# Patient Record
Sex: FEMALE | Race: WHITE | NOT HISPANIC OR LATINO
[De-identification: names, ages, dates, MRNs, and addresses within clinical notes are randomized per-mention and may not be internally consistent; named-entity substitution may affect disease eponyms.]

---

## 2019-05-07 ENCOUNTER — RESULT REVIEW (OUTPATIENT)
Age: 84
End: 2019-05-07

## 2021-02-11 PROBLEM — Z00.00 ENCOUNTER FOR PREVENTIVE HEALTH EXAMINATION: Status: ACTIVE | Noted: 2021-02-11

## 2021-04-23 ENCOUNTER — RX RENEWAL (OUTPATIENT)
Age: 86
End: 2021-04-23

## 2021-05-20 ENCOUNTER — APPOINTMENT (OUTPATIENT)
Dept: PULMONOLOGY | Facility: CLINIC | Age: 86
End: 2021-05-20

## 2021-08-06 ENCOUNTER — APPOINTMENT (OUTPATIENT)
Dept: PULMONOLOGY | Facility: CLINIC | Age: 86
End: 2021-08-06
Payer: MEDICARE

## 2021-08-06 VITALS
OXYGEN SATURATION: 95 % | SYSTOLIC BLOOD PRESSURE: 105 MMHG | HEART RATE: 79 BPM | DIASTOLIC BLOOD PRESSURE: 55 MMHG | TEMPERATURE: 97.4 F

## 2021-08-06 PROCEDURE — 99214 OFFICE O/P EST MOD 30 MIN: CPT

## 2021-08-06 NOTE — HISTORY OF PRESENT ILLNESS
[Former] : former [Never] : never [TextBox_4] : 88 female  hx copd and for fu visit\par using anoro and spiriva\par overall feels good  sl cough and minimal dyspnea\par +swallow study sl food retention and adjusting diet\par no wheeze no  chest pain no tightness\par no nite awakening [TextBox_11] : 0.2 [TextBox_13] : 10 [YearQuit] : 1983

## 2022-02-25 ENCOUNTER — APPOINTMENT (OUTPATIENT)
Dept: PULMONOLOGY | Facility: CLINIC | Age: 87
End: 2022-02-25
Payer: MEDICARE

## 2022-02-25 VITALS
DIASTOLIC BLOOD PRESSURE: 87 MMHG | BODY MASS INDEX: 23.99 KG/M2 | TEMPERATURE: 97.2 F | WEIGHT: 119 LBS | HEIGHT: 59 IN | OXYGEN SATURATION: 95 % | HEART RATE: 119 BPM | SYSTOLIC BLOOD PRESSURE: 144 MMHG

## 2022-02-25 PROCEDURE — 99214 OFFICE O/P EST MOD 30 MIN: CPT

## 2022-02-25 RX ORDER — AMITRIPTYLINE HYDROCHLORIDE 75 MG/1
75 TABLET, FILM COATED ORAL
Refills: 0 | Status: ACTIVE | COMMUNITY

## 2022-02-25 RX ORDER — BIOTIN 10 MG
TABLET ORAL
Refills: 0 | Status: ACTIVE | COMMUNITY

## 2022-02-25 RX ORDER — SOLIFENACIN SUCCINATE 10 MG/1
10 TABLET ORAL
Refills: 0 | Status: ACTIVE | COMMUNITY

## 2022-02-25 RX ORDER — PANTOPRAZOLE SODIUM 40 MG/1
40 GRANULE, DELAYED RELEASE ORAL
Refills: 0 | Status: ACTIVE | COMMUNITY

## 2022-02-25 RX ORDER — CYPROHEPTADINE HYDROCHLORIDE 4 MG/1
4 TABLET ORAL
Refills: 0 | Status: ACTIVE | COMMUNITY

## 2022-02-25 RX ORDER — MULTIVIT-MIN/FOLIC/VIT K/LYCOP 400-300MCG
50 MCG TABLET ORAL
Refills: 0 | Status: ACTIVE | COMMUNITY

## 2022-02-25 RX ORDER — POTASSIUM CHLORIDE 10 MEQ
10 CAPSULE, EXTENDED RELEASE ORAL
Refills: 0 | Status: ACTIVE | COMMUNITY

## 2022-02-25 RX ORDER — ATENOLOL 25 MG/1
25 TABLET ORAL
Refills: 0 | Status: ACTIVE | COMMUNITY

## 2022-02-25 RX ORDER — ATORVASTATIN CALCIUM 20 MG/1
20 TABLET, FILM COATED ORAL
Refills: 0 | Status: ACTIVE | COMMUNITY

## 2022-02-25 RX ORDER — ELECTROLYTES/DEXTROSE
10 SOLUTION, ORAL ORAL
Refills: 0 | Status: ACTIVE | COMMUNITY

## 2022-02-25 RX ORDER — PSYLLIUM HUSK 0.4 G
CAPSULE ORAL
Refills: 0 | Status: ACTIVE | COMMUNITY

## 2022-02-25 RX ORDER — LEVOTHYROXINE SODIUM 25 UG/1
25 TABLET ORAL
Refills: 0 | Status: ACTIVE | COMMUNITY

## 2022-02-25 RX ORDER — MELOXICAM 7.5 MG/1
7.5 TABLET ORAL
Refills: 0 | Status: ACTIVE | COMMUNITY

## 2022-02-25 NOTE — REASON FOR VISIT
[Follow-Up] : a follow-up visit [COPD] : COPD [Family Member] : family member [TextBox_44] : 6 months. Pt states she has been feeling well, pt complains of SOB on exertion but it is controllable.

## 2022-02-25 NOTE — DISCUSSION/SUMMARY
[FreeTextEntry1] : Juno Meza has COPD.  She is well maintained on the Anoro 1 inhalation a day.  She denies any significant change in her symptoms and is able to perform all of her activities of daily living.  At this time we will continue her current medication and therapy.  Patient and her daughter understand agree with this plan of care.\par The patient understands and agrees with plan of care.\par Today's office visit encompassed 46  minutes. I conducted an extensive history ,physical exam and reviewed diagnosis and treatment options  including diagnostic tests,radiologic studies including cat scans  and the use of prescription medication.

## 2022-02-25 NOTE — HISTORY OF PRESENT ILLNESS
[Former] : former [Never] : never [TextBox_4] : 89 female hx copd on anoro 1 qd for fu visit\par feels good  occ dyspnea  with  activity  walk \par no chest pain no cough no phlegm\par no wheezing in chest \par no nite awakening\par using anoro 1 qd\par no new environmental change [TextBox_11] : 0.2 [TextBox_13] : 10 [YearQuit] : 1983

## 2022-06-16 ENCOUNTER — APPOINTMENT (OUTPATIENT)
Dept: ORTHOPEDIC SURGERY | Facility: CLINIC | Age: 87
End: 2022-06-16
Payer: MEDICARE

## 2022-06-16 DIAGNOSIS — I10 ESSENTIAL (PRIMARY) HYPERTENSION: ICD-10-CM

## 2022-06-16 DIAGNOSIS — R00.0 TACHYCARDIA, UNSPECIFIED: ICD-10-CM

## 2022-06-16 DIAGNOSIS — M54.16 RADICULOPATHY, LUMBAR REGION: ICD-10-CM

## 2022-06-16 DIAGNOSIS — M19.90 UNSPECIFIED OSTEOARTHRITIS, UNSPECIFIED SITE: ICD-10-CM

## 2022-06-16 PROCEDURE — 99213 OFFICE O/P EST LOW 20 MIN: CPT

## 2022-06-16 PROCEDURE — 73503 X-RAY EXAM HIP UNI 4/> VIEWS: CPT | Mod: RT

## 2022-06-16 NOTE — ASSESSMENT
[FreeTextEntry1] : 88 yo female presenting with lumbar radiculopathy with radiation down RLE. X-rays negative for fractures/abnormalities\par -Recommended that patient follow up with her spine surgeon, Dr. Fan\par -Activities as tolerated\par -Rest, ice, PRN for pain\par -All questions answered\par -F/u PRN\par

## 2022-06-16 NOTE — PHYSICAL EXAM
[Right] : right hip [NL (120)] : flexion 120 degrees [NL (30)] : extension 30 degrees [NL (35)] : adduction 35 degrees [NL (45)] : internal rotation 45 degrees [5___] : adduction 5[unfilled]/5 [1+] : posterior tibialis pulse: 1+ [] : uses walker [AP] : anteroposterior [Lateral] : lateral [There are no fractures, subluxations or dislocations. No significant abnormalities are seen] : There are no fractures, subluxations or dislocations. No significant abnormalities are seen

## 2022-06-16 NOTE — HISTORY OF PRESENT ILLNESS
[7] : 7 [Sharp] : sharp [Walking] : walking [de-identified] : Patient reports pain in right hip/ groin x~1 month with NKI \par Reports pain radiates from right side of groin down leg into the foot\par Denies numbness or tingling in LEs \par Pt is using a walker for ambulation \par Reports having hx of Left hip fx in 2013- states she had surgery (can not recall what kind) \par pt is taking Meloxicam for arthritis \par Denies imaging or treatments to the right hip  [] : no

## 2022-08-18 ENCOUNTER — APPOINTMENT (OUTPATIENT)
Dept: PULMONOLOGY | Facility: CLINIC | Age: 87
End: 2022-08-18

## 2022-08-18 VITALS
OXYGEN SATURATION: 83 % | WEIGHT: 119 LBS | DIASTOLIC BLOOD PRESSURE: 70 MMHG | BODY MASS INDEX: 23.99 KG/M2 | TEMPERATURE: 97 F | HEART RATE: 74 BPM | HEIGHT: 59 IN | SYSTOLIC BLOOD PRESSURE: 122 MMHG

## 2022-08-18 VITALS — OXYGEN SATURATION: 94 % | HEART RATE: 74 BPM

## 2022-08-18 PROCEDURE — 99213 OFFICE O/P EST LOW 20 MIN: CPT

## 2022-08-18 RX ORDER — UMECLIDINIUM BROMIDE AND VILANTEROL TRIFENATATE 62.5; 25 UG/1; UG/1
62.5-25 POWDER RESPIRATORY (INHALATION)
Qty: 3 | Refills: 3 | Status: DISCONTINUED | COMMUNITY
Start: 2021-04-23 | End: 2022-08-18

## 2022-08-18 RX ORDER — UMECLIDINIUM BROMIDE AND VILANTEROL TRIFENATATE 62.5; 25 UG/1; UG/1
62.5-25 POWDER RESPIRATORY (INHALATION)
Qty: 1 | Refills: 6 | Status: ACTIVE | COMMUNITY
Start: 2022-02-25 | End: 1900-01-01

## 2022-08-18 NOTE — DISCUSSION/SUMMARY
[FreeTextEntry1] : Ms. Meza is doing very well.  She is well controlled on the Anoro 1 inhalation daily.  She is able to perform full activity.  We will continue this current therapy.\par The patient understands and agrees with plan of care.\par Today's office visit encompassed 32 minutes. I conducted an extensive history ,physical exam and reviewed diagnosis and treatment options  including diagnostic tests,radiologic studies including  cat scans  and the use of prescription medication.

## 2022-08-18 NOTE — REASON FOR VISIT
[Follow-Up] : a follow-up visit [COPD] : COPD [Family Member] : family member [TextBox_44] : 6 months.  Patient states that Anoro inhaler is working great.  No other pulmonary complaints.

## 2022-08-18 NOTE — HISTORY OF PRESENT ILLNESS
[Former] : former [Never] : never [TextBox_4] : 89 female hx copd for fu visit\par today feels good  no shortness of breath no .cough  no.cp no tightness \par full activity no limitation\par remains on anoro 1 qd \par no nite awakening for dyspnea [TextBox_11] : 0.2 [TextBox_13] : 10 [YearQuit] : 1983

## 2022-08-29 ENCOUNTER — RX RENEWAL (OUTPATIENT)
Age: 87
End: 2022-08-29

## 2022-08-29 RX ORDER — UMECLIDINIUM BROMIDE AND VILANTEROL TRIFENATATE 62.5; 25 UG/1; UG/1
62.5-25 POWDER RESPIRATORY (INHALATION)
Qty: 180 | Refills: 3 | Status: ACTIVE | COMMUNITY
Start: 2021-08-06 | End: 1900-01-01

## 2023-02-16 ENCOUNTER — APPOINTMENT (OUTPATIENT)
Dept: PULMONOLOGY | Facility: CLINIC | Age: 88
End: 2023-02-16
Payer: MEDICARE

## 2023-02-16 VITALS
BODY MASS INDEX: 23.99 KG/M2 | HEIGHT: 59 IN | OXYGEN SATURATION: 96 % | HEART RATE: 79 BPM | WEIGHT: 119 LBS | DIASTOLIC BLOOD PRESSURE: 62 MMHG | TEMPERATURE: 96.4 F | SYSTOLIC BLOOD PRESSURE: 112 MMHG

## 2023-02-16 DIAGNOSIS — J44.9 CHRONIC OBSTRUCTIVE PULMONARY DISEASE, UNSPECIFIED: ICD-10-CM

## 2023-02-16 PROCEDURE — 99214 OFFICE O/P EST MOD 30 MIN: CPT

## 2023-02-16 RX ORDER — MULTIVIT-MIN/IRON/FOLIC ACID/K 18-600-40
500 CAPSULE ORAL
Refills: 0 | Status: ACTIVE | COMMUNITY
Start: 2023-02-16

## 2023-02-16 RX ORDER — BLOOD SUGAR DIAGNOSTIC
300 STRIP MISCELLANEOUS
Refills: 0 | Status: ACTIVE | COMMUNITY
Start: 2023-02-16

## 2023-02-16 RX ORDER — LEVOTHYROXINE SODIUM 0.03 MG/1
25 TABLET ORAL
Refills: 0 | Status: ACTIVE | COMMUNITY

## 2023-02-16 RX ORDER — LORATADINE 5 MG/5 ML
5 SOLUTION, ORAL ORAL
Refills: 0 | Status: ACTIVE | COMMUNITY

## 2023-02-16 RX ORDER — ELECTROLYTES/DEXTROSE
10 SOLUTION, ORAL ORAL
Refills: 0 | Status: ACTIVE | COMMUNITY
Start: 2023-02-16

## 2023-02-16 RX ORDER — ADHESIVE TAPE 3"X 2.3 YD
50 MCG TAPE, NON-MEDICATED TOPICAL
Refills: 0 | Status: ACTIVE | COMMUNITY
Start: 2023-02-16

## 2023-02-16 NOTE — DISCUSSION/SUMMARY
[FreeTextEntry1] : Ms. Meza has a history of COPD.  She has been on Anoro 1 inhalation daily and is doing very well.  She denies all symptoms.  We will continue current therapy.  We will perform pulmonary function test on her next visit.\par The patient understands and agrees with plan of care.\par Today's office visit encompassed 32 minutes. I conducted an extensive history ,physical exam and reviewed diagnosis and treatment options  including diagnostic tests,radiologic studies including  cat scans  and the use of prescription medication.

## 2023-02-16 NOTE — HISTORY OF PRESENT ILLNESS
[Former] : former [Never] : never [TextBox_4] : 89 female hx copd\par today feels good  no shortness of breath no cough no wheeze no chest pain no tightness \par no fever  no phlegm\par walking with walker\par no nite awkaening [TextBox_11] : 0.2 [TextBox_13] : 10 [YearQuit] : 1983

## 2023-02-16 NOTE — REASON FOR VISIT
[Follow-Up] : a follow-up visit [COPD] : COPD [Family Member] : family member [TextBox_44] : 6 month,No symptoms.

## 2023-03-24 ENCOUNTER — NON-APPOINTMENT (OUTPATIENT)
Age: 88
End: 2023-03-24

## 2024-01-25 ENCOUNTER — NON-APPOINTMENT (OUTPATIENT)
Age: 89
End: 2024-01-25